# Patient Record
Sex: FEMALE | Race: WHITE
[De-identification: names, ages, dates, MRNs, and addresses within clinical notes are randomized per-mention and may not be internally consistent; named-entity substitution may affect disease eponyms.]

---

## 2017-06-19 ENCOUNTER — HOSPITAL ENCOUNTER (OUTPATIENT)
Dept: HOSPITAL 53 - M SMT | Age: 77
End: 2017-06-19
Attending: INTERNAL MEDICINE
Payer: MEDICARE

## 2017-06-19 DIAGNOSIS — J06.9: Primary | ICD-10-CM

## 2017-06-19 DIAGNOSIS — R09.89: ICD-10-CM

## 2017-06-19 DIAGNOSIS — I51.7: ICD-10-CM

## 2017-06-19 NOTE — REP
REASON:  Upper respiratory tract infection with cold symptomatology.

 

PRIORS:  None.

 

There is cardiomegaly.  There is bilateral hilar fullness right greater than

left.  There is evidence of interstitial fibrotic change.  No patchy opacities or

pleural effusions are present.  The osseous structures are consistent with the

patient's age.

 

IMPRESSION:

 

Cardiomegaly and hilar fullness as described above.  No priors for comparison.

Consider chest CT with contrast.

 

 

Signed by

Kristian Toledo DO 06/19/2017 05:04 P

## 2017-07-10 ENCOUNTER — HOSPITAL ENCOUNTER (OUTPATIENT)
Dept: HOSPITAL 53 - M LAB REF | Age: 77
End: 2017-07-10
Attending: INTERNAL MEDICINE
Payer: MEDICARE

## 2017-07-10 DIAGNOSIS — J06.9: Primary | ICD-10-CM

## 2017-10-12 ENCOUNTER — HOSPITAL ENCOUNTER (OUTPATIENT)
Dept: HOSPITAL 53 - M SFHCCLAY | Age: 77
End: 2017-10-12
Attending: FAMILY MEDICINE
Payer: MEDICARE

## 2017-10-12 DIAGNOSIS — R73.01: Primary | ICD-10-CM

## 2017-10-12 LAB
ANION GAP SERPL CALC-SCNC: 5 MEQ/L (ref 8–16)
BUN SERPL-MCNC: 18 MG/DL (ref 7–18)
CALCIUM SERPL-MCNC: 9.5 MG/DL (ref 8.8–10.2)
CHLORIDE SERPL-SCNC: 104 MEQ/L (ref 98–107)
CO2 SERPL-SCNC: 32 MEQ/L (ref 21–32)
CREAT SERPL-MCNC: 0.87 MG/DL (ref 0.55–1.02)
EST. AVERAGE GLUCOSE BLD GHB EST-MCNC: 226 MG/DL (ref 60–110)
GFR SERPL CREATININE-BSD FRML MDRD: > 60 ML/MIN/{1.73_M2} (ref 39–?)
GLUCOSE SERPL-MCNC: 123 MG/DL (ref 83–110)
POTASSIUM SERPL-SCNC: 4 MEQ/L (ref 3.5–5.1)
SODIUM SERPL-SCNC: 141 MEQ/L (ref 136–145)

## 2017-10-12 PROCEDURE — 83036 HEMOGLOBIN GLYCOSYLATED A1C: CPT

## 2017-10-12 PROCEDURE — 80048 BASIC METABOLIC PNL TOTAL CA: CPT

## 2017-10-12 PROCEDURE — 90662 IIV NO PRSV INCREASED AG IM: CPT

## 2017-12-22 ENCOUNTER — HOSPITAL ENCOUNTER (OUTPATIENT)
Dept: HOSPITAL 53 - M SFHCCLAY | Age: 77
End: 2017-12-22
Attending: FAMILY MEDICINE
Payer: MEDICARE

## 2017-12-22 DIAGNOSIS — R73.01: Primary | ICD-10-CM

## 2017-12-22 LAB
ANION GAP SERPL CALC-SCNC: 7 MEQ/L (ref 8–16)
BUN SERPL-MCNC: 17 MG/DL (ref 7–18)
CALCIUM SERPL-MCNC: 8.9 MG/DL (ref 8.8–10.2)
CHLORIDE SERPL-SCNC: 104 MEQ/L (ref 98–107)
CO2 SERPL-SCNC: 32 MEQ/L (ref 21–32)
CREAT SERPL-MCNC: 0.91 MG/DL (ref 0.55–1.02)
EST. AVERAGE GLUCOSE BLD GHB EST-MCNC: 148 MG/DL (ref 60–110)
GFR SERPL CREATININE-BSD FRML MDRD: > 60 ML/MIN/{1.73_M2} (ref 39–?)
GLUCOSE SERPL-MCNC: 176 MG/DL (ref 83–110)
POTASSIUM SERPL-SCNC: 4 MEQ/L (ref 3.5–5.1)
SODIUM SERPL-SCNC: 143 MEQ/L (ref 136–145)

## 2018-02-19 ENCOUNTER — HOSPITAL ENCOUNTER (OUTPATIENT)
Dept: HOSPITAL 53 - M SMT | Age: 78
End: 2018-02-19
Attending: INTERNAL MEDICINE
Payer: MEDICARE

## 2018-02-19 DIAGNOSIS — R05: ICD-10-CM

## 2018-02-19 DIAGNOSIS — J84.9: Primary | ICD-10-CM

## 2018-02-19 DIAGNOSIS — R91.8: ICD-10-CM

## 2018-02-19 DIAGNOSIS — I51.7: ICD-10-CM

## 2018-02-19 PROCEDURE — 71046 X-RAY EXAM CHEST 2 VIEWS: CPT

## 2018-03-21 ENCOUNTER — HOSPITAL ENCOUNTER (EMERGENCY)
Dept: HOSPITAL 53 - M ED | Age: 78
Discharge: HOME | End: 2018-03-21
Payer: MEDICARE

## 2018-03-21 DIAGNOSIS — Z96.641: ICD-10-CM

## 2018-03-21 DIAGNOSIS — Z98.1: ICD-10-CM

## 2018-03-21 DIAGNOSIS — M54.41: Primary | ICD-10-CM

## 2018-03-21 DIAGNOSIS — I10: ICD-10-CM

## 2018-03-21 DIAGNOSIS — Z86.711: ICD-10-CM

## 2018-03-21 DIAGNOSIS — Z79.01: ICD-10-CM

## 2018-03-21 DIAGNOSIS — Z88.8: ICD-10-CM

## 2018-03-21 DIAGNOSIS — Z79.84: ICD-10-CM

## 2018-03-21 DIAGNOSIS — Z87.891: ICD-10-CM

## 2018-03-21 DIAGNOSIS — M43.16: ICD-10-CM

## 2018-03-21 DIAGNOSIS — Z88.2: ICD-10-CM

## 2018-03-21 DIAGNOSIS — Z79.899: ICD-10-CM

## 2018-03-21 DIAGNOSIS — M48.061: ICD-10-CM

## 2018-03-21 PROCEDURE — 72131 CT LUMBAR SPINE W/O DYE: CPT

## 2018-03-21 RX ADMIN — METHOCARBAMOL TABLETS 1 MG: 500 TABLET, COATED ORAL at 16:17

## 2018-03-23 ENCOUNTER — HOSPITAL ENCOUNTER (OUTPATIENT)
Dept: HOSPITAL 53 - M SFHCCLAY | Age: 78
End: 2018-03-23
Attending: FAMILY MEDICINE
Payer: MEDICARE

## 2018-03-23 DIAGNOSIS — R73.01: Primary | ICD-10-CM

## 2018-03-23 LAB
ALBUMIN/GLOBULIN RATIO: 1.17 (ref 1–1.93)
ALBUMIN: 3.5 GM/DL (ref 3.2–5.2)
ALKALINE PHOSPHATASE: 47 U/L (ref 45–117)
ALT SERPL W P-5'-P-CCNC: 19 U/L (ref 12–78)
ANION GAP: 6 MEQ/L (ref 8–16)
AST SERPL-CCNC: 12 U/L (ref 7–37)
BILIRUBIN,TOTAL: 0.5 MG/DL (ref 0.2–1)
BLOOD UREA NITROGEN: 13 MG/DL (ref 7–18)
CALCIUM LEVEL: 9.2 MG/DL (ref 8.8–10.2)
CARBON DIOXIDE LEVEL: 32 MEQ/L (ref 21–32)
CHLORIDE LEVEL: 104 MEQ/L (ref 98–107)
CREATININE FOR GFR: 0.76 MG/DL (ref 0.55–1.3)
EST. AVERAGE GLUCOSE BLD GHB EST-MCNC: 200 MG/DL (ref 60–110)
GFR SERPL CREATININE-BSD FRML MDRD: > 60 ML/MIN/{1.73_M2} (ref 39–?)
GLUCOSE, FASTING: 114 MG/DL (ref 70–100)
POTASSIUM SERUM: 4.2 MEQ/L (ref 3.5–5.1)
SODIUM LEVEL: 142 MEQ/L (ref 136–145)
TOTAL PROTEIN: 6.5 GM/DL (ref 6.4–8.2)

## 2018-03-23 PROCEDURE — 80053 COMPREHEN METABOLIC PANEL: CPT

## 2018-07-09 ENCOUNTER — HOSPITAL ENCOUNTER (OUTPATIENT)
Dept: HOSPITAL 53 - M SFHCCLAY | Age: 78
End: 2018-07-09
Attending: FAMILY MEDICINE
Payer: MEDICARE

## 2018-07-09 DIAGNOSIS — Z86.711: ICD-10-CM

## 2018-07-09 DIAGNOSIS — R53.83: Primary | ICD-10-CM

## 2018-07-09 DIAGNOSIS — Z53.8: ICD-10-CM

## 2018-07-19 ENCOUNTER — HOSPITAL ENCOUNTER (OUTPATIENT)
Dept: HOSPITAL 53 - M SFHCCLAY | Age: 78
End: 2018-07-19
Attending: FAMILY MEDICINE
Payer: MEDICARE

## 2018-07-19 DIAGNOSIS — D64.9: ICD-10-CM

## 2018-07-19 DIAGNOSIS — E11.9: Primary | ICD-10-CM

## 2018-07-19 LAB
BASO #: 0.1 10^3/UL (ref 0–0.2)
BASO %: 0.7 % (ref 0–1)
EOS #: 0.2 10^3/UL (ref 0–0.5)
EOSINOPHIL NFR BLD AUTO: 1.9 % (ref 0–3)
EST. AVERAGE GLUCOSE BLD GHB EST-MCNC: 154 MG/DL (ref 60–110)
FOLATE SERPL-MCNC: 20.3 NG/ML (ref 5.4–?)
HEMATOCRIT: 37 % (ref 36–47)
HEMOGLOBIN: 11.9 G/DL (ref 12–15.5)
IMMATURE GRANULOCYTE %: 0.1 % (ref 0–3)
IRON (FE): 51 UG/DL (ref 50–170)
LYMPH #: 1.9 10^3/UL (ref 1.5–4.5)
LYMPH %: 23.9 % (ref 24–44)
MEAN CORPUSCULAR HEMOGLOBIN: 28.7 PG (ref 27–33)
MEAN CORPUSCULAR HGB CONC: 32.2 G/DL (ref 32–36.5)
MEAN CORPUSCULAR VOLUME: 89.2 FL (ref 80–96)
MONO #: 0.9 10^3/UL (ref 0–0.8)
MONO %: 11.3 % (ref 0–5)
NEUTROPHILS #: 5 10^3/UL (ref 1.8–7.7)
NEUTROPHILS %: 62.1 % (ref 36–66)
NRBC BLD AUTO-RTO: 0 % (ref 0–0)
PLATELET COUNT, AUTOMATED: 338 10^3/UL (ref 150–450)
RED BLOOD COUNT: 4.15 10^6/UL (ref 4–5.4)
RED CELL DISTRIBUTION WIDTH: 13.3 % (ref 11.5–14.5)
VITAMIN B12 LEVEL: 1896 PG/ML (ref 247–911)
WHITE BLOOD COUNT: 8.1 10^3/UL (ref 4–10)

## 2018-07-19 PROCEDURE — 82746 ASSAY OF FOLIC ACID SERUM: CPT

## 2018-12-04 ENCOUNTER — HOSPITAL ENCOUNTER (OUTPATIENT)
Dept: HOSPITAL 53 - M SFHCCLAY | Age: 78
End: 2018-12-04
Attending: FAMILY MEDICINE
Payer: MEDICARE

## 2018-12-04 DIAGNOSIS — E11.9: Primary | ICD-10-CM

## 2018-12-04 PROCEDURE — 83036 HEMOGLOBIN GLYCOSYLATED A1C: CPT

## 2018-12-05 LAB
ANION GAP: 6 MEQ/L (ref 8–16)
BLOOD UREA NITROGEN: 18 MG/DL (ref 7–18)
CALCIUM LEVEL: 9 MG/DL (ref 8.8–10.2)
CARBON DIOXIDE LEVEL: 30 MEQ/L (ref 21–32)
CHLORIDE LEVEL: 108 MEQ/L (ref 98–107)
CHOLEST SERPL-MCNC: 149 MG/DL (ref ?–200)
CHOLESTEROL RISK RATIO: 2.57 (ref ?–5)
CREATININE FOR GFR: 0.83 MG/DL (ref 0.55–1.3)
EST. AVERAGE GLUCOSE BLD GHB EST-MCNC: 143 MG/DL (ref 60–110)
GFR SERPL CREATININE-BSD FRML MDRD: > 60 ML/MIN/{1.73_M2} (ref 39–?)
GLUCOSE, FASTING: 123 MG/DL (ref 70–100)
HDLC SERPL-MCNC: 58 MG/DL (ref 40–?)
LDL CHOLESTEROL: 80 MG/DL (ref ?–100)
NONHDLC SERPL-MCNC: 91 MG/DL
POTASSIUM SERUM: 5.1 MEQ/L (ref 3.5–5.1)
SODIUM LEVEL: 144 MEQ/L (ref 136–145)
TRIGLYCERIDES LEVEL: 55 MG/DL (ref ?–150)

## 2019-02-21 ENCOUNTER — HOSPITAL ENCOUNTER (OUTPATIENT)
Dept: HOSPITAL 53 - M SMT | Age: 79
End: 2019-02-21
Attending: INTERNAL MEDICINE
Payer: MEDICARE

## 2019-02-21 DIAGNOSIS — R53.83: Primary | ICD-10-CM

## 2019-02-21 LAB
ALBUMIN SERPL BCG-MCNC: 3.5 GM/DL (ref 3.2–5.2)
ALT SERPL W P-5'-P-CCNC: 32 U/L (ref 12–78)
BASOPHILS # BLD AUTO: 0 10^3/UL (ref 0–0.2)
BASOPHILS NFR BLD AUTO: 0.3 % (ref 0–1)
BILIRUB SERPL-MCNC: 0.5 MG/DL (ref 0.2–1)
BUN SERPL-MCNC: 20 MG/DL (ref 7–18)
CA-I BLD-MCNC: 4.7 MG/DL (ref 4.5–5.3)
CALCIUM SERPL-MCNC: 9.2 MG/DL (ref 8.8–10.2)
CHLORIDE SERPL-SCNC: 100 MEQ/L (ref 98–107)
CO2 SERPL-SCNC: 31 MEQ/L (ref 21–32)
CREAT SERPL-MCNC: 0.91 MG/DL (ref 0.55–1.3)
EOSINOPHIL # BLD AUTO: 0.3 10^3/UL (ref 0–0.5)
EOSINOPHIL NFR BLD AUTO: 3.5 % (ref 0–3)
GFR SERPL CREATININE-BSD FRML MDRD: > 60 ML/MIN/{1.73_M2} (ref 39–?)
GLUCOSE SERPL-MCNC: 156 MG/DL (ref 70–100)
HCT VFR BLD AUTO: 41.6 % (ref 36–47)
HGB BLD-MCNC: 13.3 G/DL (ref 12–15.5)
LYMPHOCYTES # BLD AUTO: 1.4 10^3/UL (ref 1.5–4.5)
LYMPHOCYTES NFR BLD AUTO: 15.7 % (ref 24–44)
MCH RBC QN AUTO: 27.4 PG (ref 27–33)
MCHC RBC AUTO-ENTMCNC: 32 G/DL (ref 32–36.5)
MCV RBC AUTO: 85.8 FL (ref 80–96)
MONOCYTES # BLD AUTO: 1.1 10^3/UL (ref 0–0.8)
MONOCYTES NFR BLD AUTO: 12.3 % (ref 0–5)
NEUTROPHILS # BLD AUTO: 5.9 10^3/UL (ref 1.8–7.7)
NEUTROPHILS NFR BLD AUTO: 67.6 % (ref 36–66)
PLATELET # BLD AUTO: 304 10^3/UL (ref 150–450)
POTASSIUM SERPL-SCNC: 4.8 MEQ/L (ref 3.5–5.1)
PROT SERPL-MCNC: 6.6 GM/DL (ref 6.4–8.2)
RBC # BLD AUTO: 4.85 10^6/UL (ref 4–5.4)
SODIUM SERPL-SCNC: 138 MEQ/L (ref 136–145)
T4 FREE SERPL-MCNC: 1.33 NG/DL (ref 0.76–1.46)
TSH SERPL DL<=0.005 MIU/L-ACNC: 1.03 UIU/ML (ref 0.36–3.74)
WBC # BLD AUTO: 8.7 10^3/UL (ref 4–10)